# Patient Record
Sex: MALE | Race: WHITE | NOT HISPANIC OR LATINO | Employment: OTHER | URBAN - METROPOLITAN AREA
[De-identification: names, ages, dates, MRNs, and addresses within clinical notes are randomized per-mention and may not be internally consistent; named-entity substitution may affect disease eponyms.]

---

## 2024-05-18 ENCOUNTER — APPOINTMENT (OUTPATIENT)
Dept: CT IMAGING | Facility: HOSPITAL | Age: 79
End: 2024-05-18
Payer: MEDICARE

## 2024-05-18 ENCOUNTER — HOSPITAL ENCOUNTER (EMERGENCY)
Facility: HOSPITAL | Age: 79
Discharge: LEFT BEFORE DISCHARGE | End: 2024-05-18
Attending: EMERGENCY MEDICINE
Payer: MEDICARE

## 2024-05-18 ENCOUNTER — APPOINTMENT (OUTPATIENT)
Dept: GENERAL RADIOLOGY | Facility: HOSPITAL | Age: 79
End: 2024-05-18
Payer: MEDICARE

## 2024-05-18 VITALS
DIASTOLIC BLOOD PRESSURE: 80 MMHG | HEART RATE: 65 BPM | HEIGHT: 71 IN | TEMPERATURE: 97.9 F | WEIGHT: 190 LBS | BODY MASS INDEX: 26.6 KG/M2 | RESPIRATION RATE: 20 BRPM | SYSTOLIC BLOOD PRESSURE: 135 MMHG | OXYGEN SATURATION: 97 %

## 2024-05-18 DIAGNOSIS — R42 DIZZINESS: Primary | ICD-10-CM

## 2024-05-18 DIAGNOSIS — R11.2 NAUSEA AND VOMITING, UNSPECIFIED VOMITING TYPE: ICD-10-CM

## 2024-05-18 DIAGNOSIS — R94.31 ABNORMAL EKG: ICD-10-CM

## 2024-05-18 LAB
ALBUMIN SERPL-MCNC: 4.2 G/DL (ref 3.5–5.2)
ALBUMIN/GLOB SERPL: 1.4 G/DL
ALP SERPL-CCNC: 87 U/L (ref 39–117)
ALT SERPL W P-5'-P-CCNC: 19 U/L (ref 1–41)
ANION GAP SERPL CALCULATED.3IONS-SCNC: 12 MMOL/L (ref 5–15)
APTT PPP: 38 SECONDS (ref 61–76.5)
AST SERPL-CCNC: 22 U/L (ref 1–40)
BASOPHILS # BLD AUTO: 0.02 10*3/MM3 (ref 0–0.2)
BASOPHILS NFR BLD AUTO: 0.2 % (ref 0–1.5)
BILIRUB SERPL-MCNC: 0.3 MG/DL (ref 0–1.2)
BUN SERPL-MCNC: 15 MG/DL (ref 8–23)
BUN/CREAT SERPL: 13.4 (ref 7–25)
CALCIUM SPEC-SCNC: 9.3 MG/DL (ref 8.6–10.5)
CHLORIDE SERPL-SCNC: 101 MMOL/L (ref 98–107)
CO2 SERPL-SCNC: 27 MMOL/L (ref 22–29)
CREAT SERPL-MCNC: 1.12 MG/DL (ref 0.76–1.27)
DEPRECATED RDW RBC AUTO: 45.1 FL (ref 37–54)
EGFRCR SERPLBLD CKD-EPI 2021: 67.2 ML/MIN/1.73
EOSINOPHIL # BLD AUTO: 0.09 10*3/MM3 (ref 0–0.4)
EOSINOPHIL NFR BLD AUTO: 1 % (ref 0.3–6.2)
ERYTHROCYTE [DISTWIDTH] IN BLOOD BY AUTOMATED COUNT: 13.1 % (ref 12.3–15.4)
GEN 5 2HR TROPONIN T REFLEX: 11 NG/L
GLOBULIN UR ELPH-MCNC: 3 GM/DL
GLUCOSE SERPL-MCNC: 141 MG/DL (ref 65–99)
HCT VFR BLD AUTO: 44.9 % (ref 37.5–51)
HGB BLD-MCNC: 14.5 G/DL (ref 13–17.7)
HOLD SPECIMEN: NORMAL
HOLD SPECIMEN: NORMAL
IMM GRANULOCYTES # BLD AUTO: 0.03 10*3/MM3 (ref 0–0.05)
IMM GRANULOCYTES NFR BLD AUTO: 0.3 % (ref 0–0.5)
INR PPP: 2.22 (ref 2–3)
LYMPHOCYTES # BLD AUTO: 1.09 10*3/MM3 (ref 0.7–3.1)
LYMPHOCYTES NFR BLD AUTO: 11.6 % (ref 19.6–45.3)
MAGNESIUM SERPL-MCNC: 2 MG/DL (ref 1.6–2.4)
MCH RBC QN AUTO: 30.3 PG (ref 26.6–33)
MCHC RBC AUTO-ENTMCNC: 32.3 G/DL (ref 31.5–35.7)
MCV RBC AUTO: 93.9 FL (ref 79–97)
MONOCYTES # BLD AUTO: 0.63 10*3/MM3 (ref 0.1–0.9)
MONOCYTES NFR BLD AUTO: 6.7 % (ref 5–12)
NEUTROPHILS NFR BLD AUTO: 7.51 10*3/MM3 (ref 1.7–7)
NEUTROPHILS NFR BLD AUTO: 80.2 % (ref 42.7–76)
NRBC BLD AUTO-RTO: 0 /100 WBC (ref 0–0.2)
PLATELET # BLD AUTO: 173 10*3/MM3 (ref 140–450)
PMV BLD AUTO: 11 FL (ref 6–12)
POTASSIUM SERPL-SCNC: 4.2 MMOL/L (ref 3.5–5.2)
PROT SERPL-MCNC: 7.2 G/DL (ref 6–8.5)
PROTHROMBIN TIME: 22.8 SECONDS (ref 19.4–28.5)
RBC # BLD AUTO: 4.78 10*6/MM3 (ref 4.14–5.8)
SODIUM SERPL-SCNC: 140 MMOL/L (ref 136–145)
TROPONIN T DELTA: 1 NG/L
TROPONIN T SERPL HS-MCNC: 10 NG/L
WBC NRBC COR # BLD AUTO: 9.37 10*3/MM3 (ref 3.4–10.8)
WHOLE BLOOD HOLD COAG: NORMAL
WHOLE BLOOD HOLD SPECIMEN: NORMAL

## 2024-05-18 PROCEDURE — 93005 ELECTROCARDIOGRAM TRACING: CPT

## 2024-05-18 PROCEDURE — 70450 CT HEAD/BRAIN W/O DYE: CPT

## 2024-05-18 PROCEDURE — 84484 ASSAY OF TROPONIN QUANT: CPT | Performed by: EMERGENCY MEDICINE

## 2024-05-18 PROCEDURE — 83735 ASSAY OF MAGNESIUM: CPT | Performed by: EMERGENCY MEDICINE

## 2024-05-18 PROCEDURE — 36415 COLL VENOUS BLD VENIPUNCTURE: CPT

## 2024-05-18 PROCEDURE — 96375 TX/PRO/DX INJ NEW DRUG ADDON: CPT

## 2024-05-18 PROCEDURE — 80053 COMPREHEN METABOLIC PANEL: CPT | Performed by: EMERGENCY MEDICINE

## 2024-05-18 PROCEDURE — 70496 CT ANGIOGRAPHY HEAD: CPT

## 2024-05-18 PROCEDURE — 85025 COMPLETE CBC W/AUTO DIFF WBC: CPT | Performed by: EMERGENCY MEDICINE

## 2024-05-18 PROCEDURE — 25510000001 IOPAMIDOL PER 1 ML: Performed by: EMERGENCY MEDICINE

## 2024-05-18 PROCEDURE — 85730 THROMBOPLASTIN TIME PARTIAL: CPT | Performed by: EMERGENCY MEDICINE

## 2024-05-18 PROCEDURE — 71045 X-RAY EXAM CHEST 1 VIEW: CPT

## 2024-05-18 PROCEDURE — 85610 PROTHROMBIN TIME: CPT | Performed by: EMERGENCY MEDICINE

## 2024-05-18 PROCEDURE — 25010000002 ONDANSETRON PER 1 MG: Performed by: EMERGENCY MEDICINE

## 2024-05-18 PROCEDURE — 93005 ELECTROCARDIOGRAM TRACING: CPT | Performed by: EMERGENCY MEDICINE

## 2024-05-18 PROCEDURE — 96374 THER/PROPH/DIAG INJ IV PUSH: CPT

## 2024-05-18 PROCEDURE — 70498 CT ANGIOGRAPHY NECK: CPT

## 2024-05-18 PROCEDURE — 99285 EMERGENCY DEPT VISIT HI MDM: CPT

## 2024-05-18 RX ORDER — ONDANSETRON 2 MG/ML
4 INJECTION INTRAMUSCULAR; INTRAVENOUS ONCE
Status: COMPLETED | OUTPATIENT
Start: 2024-05-18 | End: 2024-05-18

## 2024-05-18 RX ORDER — MECLIZINE HYDROCHLORIDE 25 MG/1
25 TABLET ORAL ONCE
Status: COMPLETED | OUTPATIENT
Start: 2024-05-18 | End: 2024-05-18

## 2024-05-18 RX ORDER — SODIUM CHLORIDE 0.9 % (FLUSH) 0.9 %
10 SYRINGE (ML) INJECTION AS NEEDED
Status: DISCONTINUED | OUTPATIENT
Start: 2024-05-18 | End: 2024-05-19 | Stop reason: HOSPADM

## 2024-05-18 RX ORDER — PANTOPRAZOLE SODIUM 40 MG/10ML
40 INJECTION, POWDER, LYOPHILIZED, FOR SOLUTION INTRAVENOUS ONCE
Status: COMPLETED | OUTPATIENT
Start: 2024-05-18 | End: 2024-05-18

## 2024-05-18 RX ORDER — MECLIZINE HYDROCHLORIDE 25 MG/1
25 TABLET ORAL 3 TIMES DAILY PRN
Qty: 24 TABLET | Refills: 0 | Status: SHIPPED | OUTPATIENT
Start: 2024-05-18

## 2024-05-18 RX ADMIN — IOPAMIDOL 100 ML: 755 INJECTION, SOLUTION INTRAVENOUS at 20:26

## 2024-05-18 RX ADMIN — ONDANSETRON 4 MG: 2 INJECTION INTRAMUSCULAR; INTRAVENOUS at 18:58

## 2024-05-18 RX ADMIN — PANTOPRAZOLE SODIUM 40 MG: 40 INJECTION, POWDER, FOR SOLUTION INTRAVENOUS at 18:58

## 2024-05-18 RX ADMIN — MECLIZINE HYDROCHLORIDE 25 MG: 25 TABLET ORAL at 19:02

## 2024-05-18 NOTE — ED PROVIDER NOTES
Subjective   History of Present Illness  Chief complaint: Patient is a 79-year-old who had sudden onset dizziness at wedding reception.  He is from out of town and was feeling fine prior to this.  He started becoming dizzy.  Initially described it as spinning.  He has been vomiting multiple times since.  Not improving so he presented here for further evaluation.  He has not had fever.  He never had any confusion or speech changes.  He had no focal weakness or numbness.  No chest pain or shortness of breath.  He is on warfarin chronically.  Is a pacemaker as well.    Context:    Duration:    Timing:    Severity: Severe    Associated Symptoms:        PCP:  LMP:      Review of Systems   Respiratory: Negative.     Cardiovascular: Negative.    Gastrointestinal:  Positive for nausea and vomiting.   Neurological:  Positive for dizziness and light-headedness.       No past medical history on file.    No Known Allergies    No past surgical history on file.    No family history on file.             Objective   Physical Exam  Vitals and nursing note reviewed.   Constitutional:       Appearance: He is ill-appearing.   HENT:      Head: Normocephalic and atraumatic.   Eyes:      Extraocular Movements: Extraocular movements intact.      Pupils: Pupils are equal, round, and reactive to light.   Cardiovascular:      Rate and Rhythm: Normal rate.      Heart sounds: Normal heart sounds.   Pulmonary:      Effort: Pulmonary effort is normal.      Breath sounds: Normal breath sounds.   Abdominal:      Tenderness: There is no abdominal tenderness.   Musculoskeletal:         General: Normal range of motion.   Skin:     General: Skin is warm.      Coloration: Skin is pale.   Neurological:      General: No focal deficit present.      Mental Status: He is alert and oriented to person, place, and time.      Cranial Nerves: No cranial nerve deficit.      Sensory: No sensory deficit.      Motor: No weakness.      Coordination: Coordination  normal.   Psychiatric:         Mood and Affect: Mood normal.         Thought Content: Thought content normal.         Judgment: Judgment normal.         Procedures           ED Course  ED Course as of 05/18/24 2155   Sat May 18, 2024   1901 I discussed the case with .  He recommends CT angiogram as well as CT. [LH]   2150 Spoke with .  CT angiogram was normal. [LH]      ED Course User Index  [LH] Ellis Espinoza,                                  Results for orders placed or performed during the hospital encounter of 05/18/24   Comprehensive Metabolic Panel    Specimen: Blood   Result Value Ref Range    Glucose 141 (H) 65 - 99 mg/dL    BUN 15 8 - 23 mg/dL    Creatinine 1.12 0.76 - 1.27 mg/dL    Sodium 140 136 - 145 mmol/L    Potassium 4.2 3.5 - 5.2 mmol/L    Chloride 101 98 - 107 mmol/L    CO2 27.0 22.0 - 29.0 mmol/L    Calcium 9.3 8.6 - 10.5 mg/dL    Total Protein 7.2 6.0 - 8.5 g/dL    Albumin 4.2 3.5 - 5.2 g/dL    ALT (SGPT) 19 1 - 41 U/L    AST (SGOT) 22 1 - 40 U/L    Alkaline Phosphatase 87 39 - 117 U/L    Total Bilirubin 0.3 0.0 - 1.2 mg/dL    Globulin 3.0 gm/dL    A/G Ratio 1.4 g/dL    BUN/Creatinine Ratio 13.4 7.0 - 25.0    Anion Gap 12.0 5.0 - 15.0 mmol/L    eGFR 67.2 >60.0 mL/min/1.73   aPTT    Specimen: Blood   Result Value Ref Range    PTT 38.0 (L) 61.0 - 76.5 seconds   High Sensitivity Troponin T    Specimen: Blood   Result Value Ref Range    HS Troponin T 10 <22 ng/L   Protime-INR    Specimen: Blood   Result Value Ref Range    Protime 22.8 19.4 - 28.5 Seconds    INR 2.22 2.00 - 3.00   Magnesium    Specimen: Blood   Result Value Ref Range    Magnesium 2.0 1.6 - 2.4 mg/dL   CBC Auto Differential    Specimen: Blood   Result Value Ref Range    WBC 9.37 3.40 - 10.80 10*3/mm3    RBC 4.78 4.14 - 5.80 10*6/mm3    Hemoglobin 14.5 13.0 - 17.7 g/dL    Hematocrit 44.9 37.5 - 51.0 %    MCV 93.9 79.0 - 97.0 fL    MCH 30.3 26.6 - 33.0 pg    MCHC 32.3 31.5 - 35.7 g/dL    RDW 13.1 12.3 - 15.4 %     RDW-SD 45.1 37.0 - 54.0 fl    MPV 11.0 6.0 - 12.0 fL    Platelets 173 140 - 450 10*3/mm3    Neutrophil % 80.2 (H) 42.7 - 76.0 %    Lymphocyte % 11.6 (L) 19.6 - 45.3 %    Monocyte % 6.7 5.0 - 12.0 %    Eosinophil % 1.0 0.3 - 6.2 %    Basophil % 0.2 0.0 - 1.5 %    Immature Grans % 0.3 0.0 - 0.5 %    Neutrophils, Absolute 7.51 (H) 1.70 - 7.00 10*3/mm3    Lymphocytes, Absolute 1.09 0.70 - 3.10 10*3/mm3    Monocytes, Absolute 0.63 0.10 - 0.90 10*3/mm3    Eosinophils, Absolute 0.09 0.00 - 0.40 10*3/mm3    Basophils, Absolute 0.02 0.00 - 0.20 10*3/mm3    Immature Grans, Absolute 0.03 0.00 - 0.05 10*3/mm3    nRBC 0.0 0.0 - 0.2 /100 WBC   High Sensitivity Troponin T 2Hr    Specimen: Arm, Right; Blood   Result Value Ref Range    HS Troponin T 11 <22 ng/L    Troponin T Delta 1 >=-4 - <+4 ng/L   ECG 12 Lead Other; dizziness   Result Value Ref Range    QT Interval 403 ms    QTC Interval 510 ms   Green Top (Gel)   Result Value Ref Range    Extra Tube Hold for add-ons.    Lavender Top   Result Value Ref Range    Extra Tube hold for add-on    Gold Top - SST   Result Value Ref Range    Extra Tube Hold for add-ons.    Light Blue Top   Result Value Ref Range    Extra Tube Hold for add-ons.             CT Angiogram Head    Addendum Date: 5/18/2024    ADDENDUM #1 The impression should read unremarkable CT angiogram of the head and neck Electronically Signed: Erick Henson MD  5/18/2024 9:46 PM EDT  Workstation ID: [id number] ORIGINAL REPORT: CT ANGIOGRAM HEAD, CT ANGIOGRAM NECK Date of Exam: 5/18/2024 8:25 PM EDT Indication: vertigo. Comparison: None available. Technique: CTA of the head was performed after the uneventful intravenous administration of iodinated contrast. Reconstructed coronal and sagittal images were also obtained. In addition, a 3-D volume rendered image was created for interpretation. Automated exposure control and iterative reconstruction methods were used. Findings: Aortic arch: The aortic arch is unremarkable.   There is conventional 3 vessel arch anatomy.  The right brachiocephalic and visualized bilateral subclavian arteries are within normal limits. Right carotid: The right CCA arises as expected from the brachiocephalic trunk.  The CCA follows a normal course and appears normal caliber.  The carotid bifurcation is unremarkable.  The external carotid artery and distal branches appear within normal limits.  The cervical internal carotid artery follows a normal course and appears normal caliber throughout the neck and into the head.  The intracranial ICA segments appear within normal limits.  The ophthalmic artery origin is normal.  The A1 and M1 segments appear within normal limits.  The visualized distal CECI and MCA branches appear patent.  There is  a patent  anterior communicating artery. There is a patent  posterior communicating artery. Left carotid: The left CCA arises as expected from the aortic arch.   The CCA follows a normal course and appears normal caliber.  The carotid bifurcation is unremarkable.  The external carotid artery and distal branches appear within normal limits.  The  cervical internal carotid artery follows a normal course and appears normal caliber throughout the neck and into the head.  The intracranial ICA segments appear within normal limits.  The ophthalmic artery origin is normal.  The A1 and M1 segments appear within normal limits.  The visualized distal CECI and MCA branches appear patent.  There is a patent  posterior communicating artery. Posterior circulation: Vertebral arteries arise as expected from ipsilateral subclavian arteries.  The vertebral arteries are codominant.  The vertebral arteries follow a normal course and appear normal caliber throughout the neck and into the head.  The  V4 segments are patent.  Visualized posterior inferior cerebellar arteries are within normal limits.  The basilar artery is normal caliber.  Superior cerebellar arteries are patent.  Bilateral P1  segments and posterior cerebral arteries appear within normal limits. Nonvascular findings: Intracranial structures appear unremarkable.  Orbits are within normal limits.  Paranasal sinuses and mastoid air cells appear well aerated.  Superficial soft tissues and underlying musculature appear within normal limits.  The lung  apices are clear.  The thyroid and salivary glands appear unremarkable.  The suprahyoid and infrahyoid spaces of the neck appear unremarkable.  There is no evidence of cervical lymphadenopathy or a neck mass.  There are no acute osseous abnormalities or  destructive bone lesions. Impression: 1. Try to get clean from subtyping order/time to colon (though was able to wait and dated the unremarkable CT angiogram of the head and neck Electronically Signed: Erick Henson MD  5/18/2024 8:55 PM EDT  Workstation ID: [id number]    Result Date: 5/18/2024  Impression: 1. Try to get clean from subtyping order/time to colon (though was able to wait and dated the unremarkable CT angiogram of the head and neck Electronically Signed: Erick Henson MD  5/18/2024 8:55 PM EDT  Workstation ID: [id number]    CT Angiogram Neck    Addendum Date: 5/18/2024    ADDENDUM #1 The impression should read unremarkable CT angiogram of the head and neck Electronically Signed: Erick Henson MD  5/18/2024 9:46 PM EDT  Workstation ID: [id number] ORIGINAL REPORT: CT ANGIOGRAM HEAD, CT ANGIOGRAM NECK Date of Exam: 5/18/2024 8:25 PM EDT Indication: vertigo. Comparison: None available. Technique: CTA of the head was performed after the uneventful intravenous administration of iodinated contrast. Reconstructed coronal and sagittal images were also obtained. In addition, a 3-D volume rendered image was created for interpretation. Automated exposure control and iterative reconstruction methods were used. Findings: Aortic arch: The aortic arch is unremarkable.  There is conventional 3 vessel arch anatomy.  The right brachiocephalic and visualized  bilateral subclavian arteries are within normal limits. Right carotid: The right CCA arises as expected from the brachiocephalic trunk.  The CCA follows a normal course and appears normal caliber.  The carotid bifurcation is unremarkable.  The external carotid artery and distal branches appear within normal limits.  The cervical internal carotid artery follows a normal course and appears normal caliber throughout the neck and into the head.  The intracranial ICA segments appear within normal limits.  The ophthalmic artery origin is normal.  The A1 and M1 segments appear within normal limits.  The visualized distal CECI and MCA branches appear patent.  There is  a patent  anterior communicating artery. There is a patent  posterior communicating artery. Left carotid: The left CCA arises as expected from the aortic arch.   The CCA follows a normal course and appears normal caliber.  The carotid bifurcation is unremarkable.  The external carotid artery and distal branches appear within normal limits.  The  cervical internal carotid artery follows a normal course and appears normal caliber throughout the neck and into the head.  The intracranial ICA segments appear within normal limits.  The ophthalmic artery origin is normal.  The A1 and M1 segments appear within normal limits.  The visualized distal CECI and MCA branches appear patent.  There is a patent  posterior communicating artery. Posterior circulation: Vertebral arteries arise as expected from ipsilateral subclavian arteries.  The vertebral arteries are codominant.  The vertebral arteries follow a normal course and appear normal caliber throughout the neck and into the head.  The  V4 segments are patent.  Visualized posterior inferior cerebellar arteries are within normal limits.  The basilar artery is normal caliber.  Superior cerebellar arteries are patent.  Bilateral P1 segments and posterior cerebral arteries appear within normal limits. Nonvascular findings:  Intracranial structures appear unremarkable.  Orbits are within normal limits.  Paranasal sinuses and mastoid air cells appear well aerated.  Superficial soft tissues and underlying musculature appear within normal limits.  The lung  apices are clear.  The thyroid and salivary glands appear unremarkable.  The suprahyoid and infrahyoid spaces of the neck appear unremarkable.  There is no evidence of cervical lymphadenopathy or a neck mass.  There are no acute osseous abnormalities or  destructive bone lesions. Impression: 1. Try to get clean from subtyping order/time to colon (though was able to wait and dated the unremarkable CT angiogram of the head and neck Electronically Signed: Erick Henson MD  5/18/2024 8:55 PM EDT  Workstation ID: [id number]    Result Date: 5/18/2024  Impression: 1. Try to get clean from subtyping order/time to colon (though was able to wait and dated the unremarkable CT angiogram of the head and neck Electronically Signed: Erick Henson MD  5/18/2024 8:55 PM EDT  Workstation ID: [id number]    CT Head Without Contrast    Result Date: 5/18/2024  Impression: No acute intracranial process. Electronically Signed: Erick Henson MD  5/18/2024 8:41 PM EDT  Workstation ID: [id number]    XR Chest 1 View    Result Date: 5/18/2024  Impression: Mild vascular congestion.. Electronically Signed: Asher Rollins MD  5/18/2024 7:24 PM EDT  Workstation ID: [id number]          Medical Decision Making  Patient was seen evaluated for the above problem    Differential diagnosis includes but not limited to CVA, peripheral vertigo, ACS    Patient had initial EKG interpreted by myself to show a narrow complex rhythm rate of 96 with a prolonged QTc of 510 ms.  Magnesium and potassium were normal.  Patient had significant dizziness and it appeared to be worse with movement of his head.  I was concerned of a neurologic cause as well including posterior circulation blockage or spontaneous dissection.  He is on warfarin chronically  and is anticoagulated.  His INR is 2.2.  I spoke with  of neurology.  He recommended CT and CT angiogram.  This showed no intracerebral hemorrhage or occlusion.  No signs of dissection or emergent finding.  I discussed the results with the radiologist, .  Patient is improving in symptoms.  However with prolonged QT and his dizziness I do want to place in the hospital.  He declines to stay in the hospital.  He understands the risks up to and including death.  He will sign out AMA.    Amount and/or Complexity of Data Reviewed  Labs: ordered. Decision-making details documented in ED Course.     Details: Labs reviewed by myself  Radiology: ordered and independent interpretation performed.     Details: CT and CTA reviewed by myself  ECG/medicine tests: ordered and independent interpretation performed.     Details: Interpreted by myself as above    Risk  Prescription drug management.  Decision regarding hospitalization.        Final diagnoses:   None   Dizziness  Vomiting  Abnormal EKG    ED Disposition  ED Disposition       None            No follow-up provider specified.       Medication List      No changes were made to your prescriptions during this visit.            Ellis Espinoza DO  05/18/24 2139

## 2024-05-19 LAB
QT INTERVAL: 403 MS
QTC INTERVAL: 510 MS